# Patient Record
Sex: FEMALE | Race: WHITE
[De-identification: names, ages, dates, MRNs, and addresses within clinical notes are randomized per-mention and may not be internally consistent; named-entity substitution may affect disease eponyms.]

---

## 2017-03-10 ENCOUNTER — HOSPITAL ENCOUNTER (OUTPATIENT)
Dept: HOSPITAL 80 - FIMAGING | Age: 42
End: 2017-03-10
Attending: OBSTETRICS & GYNECOLOGY
Payer: COMMERCIAL

## 2017-03-10 DIAGNOSIS — N92.0: Primary | ICD-10-CM

## 2017-06-05 ENCOUNTER — HOSPITAL ENCOUNTER (EMERGENCY)
Dept: HOSPITAL 80 - FED | Age: 42
Discharge: HOME | End: 2017-06-05
Payer: COMMERCIAL

## 2017-06-05 VITALS
HEART RATE: 70 BPM | DIASTOLIC BLOOD PRESSURE: 76 MMHG | OXYGEN SATURATION: 98 % | TEMPERATURE: 98.1 F | SYSTOLIC BLOOD PRESSURE: 122 MMHG

## 2017-06-05 VITALS — RESPIRATION RATE: 16 BRPM

## 2017-06-05 DIAGNOSIS — I10: ICD-10-CM

## 2017-06-05 DIAGNOSIS — J01.00: ICD-10-CM

## 2017-06-05 DIAGNOSIS — G43.009: Primary | ICD-10-CM

## 2017-06-05 NOTE — EDPHY
HPI/HX/ROS/PE/MDM


Narrative: 





CHIEF COMPLAINT: Headache





HISTORY OF PRESENT ILLNESS: The patient is a 41-year-old female with history of 

migraines, who presents with headache that started around 12 p.m. The patient 

had a headache yesterday, no associated vomiting, or light sensitivity. She 

took Ibuprofen and headache improved. Around 12 p.m. today she developed 

frontal headache, that feels similar to previous migraines. She has not had a 

migraine for five years. Her headache today is severe, it is localized across 

the frontal region.   She has associated vomiting and photophobia. The patient 

recently had UTI and was on antibiotics. She denies numbness, tingling, or 

weakness in her extremities. She reports no recent head trauma. The patient is 

noncompliant with her hypertension medication, she states she does not take it 

because of the side effects. She had a cold last week. No persistent symptoms. 





REVIEW OF SYSTEMS:


Aside from elements discussed in the HPI, a comprehensive 10-point review of 

systems was reviewed and is negative.





PAST MEDICAL HISTORY: Migraines, Hypertension.  Patient reports that she has 

not been taking her antihypertensive medications.





SOCIAL HISTORY: . Lives in [place]. 





VITAL SIGNS Reviewed by me.


GENERAL:  Very uncomfortable appearing, resting in a dark room.


HEENT:  Atraumatic.  Eyes:  PERRL, EOMI, no nystagmus.  No icterus.  No 

injection.  Mouth:  moist mucous membranes.  No erythema or lesions. Neck:  No 

meningitis.  Nontender to palpation.  No adenopathy.   Negative Kernig's.  

Negative Brudzinski's.  No meningismus.


LUNGS:  Clear to auscultation bilaterally, no wheezes, rhonchi or rales.


CARDIAC:  Regular rate and rhythm, no rubs, murmurs or gallops.


ABDOMEN:  Soft, nontender, nondistended, bowel sounds normal.


BACK:  No CVA tenderness.


EXTREMITIES:  No trauma. No edema.  Range of motion is normal throughout.


NEURO:  Alert and oriented, cranial nerves II through XII are intact.  Motor 

strength 5 over 5 in all major muscle groups.  Sensation intact to light touch.

  Normal gait.


SKIN:  Warm and dry, no rash.


PSYCHIATRIC:  Normal mentation, no agitation.





Portions of this note were transcribed by a medical scribe.  I personally 

performed a history, physical exam, medical decision making, and confirmed 

accuracy of information the transcribed note.


ED Course: 


Patient presents with migraine headache. Patient has not had migraine for over 

5 years, with no recent workups. She reports severe, stabbing frontal headache. 

Associated vomiting and photophobia. IV established, patient received IV Zofran 

for nausea. Plan to treat headache with IV Decadron, Benadryl, Dilaudid, Reglan

, and fluids. I ordered a head CT for further evaluation. 





Patient's head CT demonstrates no intracranial findings but the patient does 

have bilateral maxillary sinusitis.  On re-examination she reports having a 

cold last week.  Patient's pain has been decreased from 8/10 to 4/10.  Toradol 

was administered.





Patient was improved following the Toradol.  She was comfortable being 

discharged home with hydrocodone to use as needed for any recurrent 

breakthrough pain. Patient was also instructed to continue taking her Bactrim 

for full 10 days to treat any acute maxillary sinusitis.


MDM: 





After history was obtained, and the physical exam performed, a differential for 

headache was considered including, but not limited to, subarachnoid hemorrhage, 

migraine headache, tension headache and infectious causes such as meningitis, 

sinusitis, encephalitis.





- Data Points


Imaging Results: 


 Imaging Impressions





Head CT  06/05/17 20:19


Impression:


1.  Moderate bilateral maxillary sinusitis..


2. Otherwise, normal noncontrast CT brain.


 


Findings and recommendations discussed with Emergency Department physician, Ely Das M.D., at 2130 hours, on June 5, 2017.


 


Final report concurs with initial preliminary interpretation.











Imaging: Discussed imaging studies w/ On call Radiologist


Medications Given: 


 








Discontinued Medications





Dexamethasone (Decadron Injection)  10 mg IVP EDNOW ONE


   Stop: 06/05/17 20:19


   Last Admin: 06/05/17 20:44 Dose:  10 mg


Diphenhydramine HCl (Benadryl Injection)  25 mg IVP EDNOW ONE


   Stop: 06/05/17 20:19


   Last Admin: 06/05/17 20:44 Dose:  25 mg


Hydromorphone HCl (Dilaudid)  0.5 mg IVP EDNOW ONE


   Stop: 06/05/17 20:19


   Last Admin: 06/05/17 20:45 Dose:  0.5 mg


Sodium Chloride (Ns)  1,000 mls @ 0 mls/hr IV ONCE ONE


   PRN Reason: Wide Open


   Stop: 06/05/17 19:57


   Last Admin: 06/05/17 19:57 Dose:  1,000 mls


Ketorolac Tromethamine (Toradol)  30 mg IVP EDNOW ONE


   Stop: 06/05/17 21:39


   Last Admin: 06/05/17 21:45 Dose:  30 mg


Metoclopramide HCl (Reglan Injection)  10 mg IVP EDNOW ONE


   Stop: 06/05/17 20:19


   Last Admin: 06/05/17 20:45 Dose:  10 mg


Ondansetron HCl (Zofran)  4 mg IVP EDNOW ONE


   Stop: 06/05/17 19:55


   Last Admin: 06/05/17 19:56 Dose:  4 mg








General


Time Seen by Provider: 06/05/17 19:55


Initial Vital Signs: 


 Initial Vital Signs











Temperature (C)  36.8 C   06/05/17 18:52


 


Heart Rate  72   06/05/17 18:52


 


Respiratory Rate  16   06/05/17 18:52


 


Blood Pressure  167/95 H  06/05/17 18:52


 


O2 Sat (%)  99   06/05/17 18:52








 











O2 Delivery Mode               Room Air














Allergies/Adverse Reactions: 


 





No Known Allergies Allergy (Unverified 03/07/16 20:28)


 








Home Medications: 














 Medication  Instructions  Recorded


 


Lisinopril  03/07/16


 


Bactrim DS  06/05/17


 


Hydrocodone/APAP 5/325 [Norco 1 tab PO Q6H PRN #8 tab 06/05/17





5/325 (RX)]  














Departure





- Departure


Disposition: Home, Routine, Self-Care


Clinical Impression: 


Migraine headache


Qualifiers:


 Migraine type: without aura Status migrainosus presence: without status 

migrainosus Intractability: not intractable Qualified Code(s): G43.009 - 

Migraine without aura, not intractable, without status migrainosus





Sinusitis


Qualifiers:


 Sinusitis location: maxillary Chronicity: acute Recurrence: not specified as 

recurrent Qualified Code(s): J01.00 - Acute maxillary sinusitis, unspecified





Condition: Good


Instructions:  Migraine Headache (ED)


Additional Instructions: 


Followup with your primary care physician as needed. 





Return to the Emergency Department if symptoms return. 





Okay to take hydrocodone if needed for ongoing pain.  Be sure you get a good 

night sleep.





To treat your sinusitis, I recommend over-the-counter Flonase as well as Afrin 

nasal spray for decongestant.  Sinusitis typically will not clear well without 

a decongestant.  Please call your physician to have your Bactrim extended for 

full 10 days.


Referrals: 


Jose Hernandez MD [Primary Care Provider] - As per Instructions


Prescriptions: 


Hydrocodone/APAP 5/325 [Norco 5/325 (RX)] 1 tab PO Q6H PRN #8 tab


 PRN Reason: Pain


Report Scribed for: Ely Das


Report Scribed by: Alexandra Gundersen


Date of Report: 06/05/17


Time of Report: 20:20

## 2017-09-05 ENCOUNTER — HOSPITAL ENCOUNTER (EMERGENCY)
Dept: HOSPITAL 80 - FED | Age: 42
Discharge: HOME | End: 2017-09-05
Payer: COMMERCIAL

## 2017-09-05 VITALS — RESPIRATION RATE: 18 BRPM | OXYGEN SATURATION: 99 % | TEMPERATURE: 98.2 F

## 2017-09-05 VITALS — SYSTOLIC BLOOD PRESSURE: 125 MMHG | HEART RATE: 67 BPM | DIASTOLIC BLOOD PRESSURE: 85 MMHG

## 2017-09-05 DIAGNOSIS — R06.02: Primary | ICD-10-CM

## 2017-09-05 DIAGNOSIS — I10: ICD-10-CM

## 2017-09-05 DIAGNOSIS — E86.9: ICD-10-CM

## 2017-09-05 LAB
% IMMATURE GRANULYOCYTES: 0.3 % (ref 0–1.1)
ABSOLUTE IMMATURE GRANULOCYTES: 0.02 10^3/UL (ref 0–0.1)
ABSOLUTE NRBC COUNT: 0 10^3/UL (ref 0–0.01)
ADD DIFF?: NO
ADD MORPH?: NO
ADD SCAN?: NO
ANION GAP SERPL CALC-SCNC: 11 MEQ/L (ref 8–16)
ATYPICAL LYMPHOCYTE FLAG: 0 (ref 0–99)
CALCIUM SERPL-MCNC: 10.4 MG/DL (ref 8.5–10.4)
CHLORIDE SERPL-SCNC: 102 MEQ/L (ref 97–110)
CO2 SERPL-SCNC: 26 MEQ/L (ref 22–31)
CREAT SERPL-MCNC: 1 MG/DL (ref 0.6–1)
ERYTHROCYTE [DISTWIDTH] IN BLOOD BY AUTOMATED COUNT: 12.6 % (ref 11.5–15.2)
FRAGMENT RBC FLAG: 0 (ref 0–99)
GFR SERPL CREATININE-BSD FRML MDRD: > 60 ML/MIN/{1.73_M2}
GLUCOSE SERPL-MCNC: 88 MG/DL (ref 70–100)
HCT VFR BLD CALC: 44.7 % (ref 38–47)
HGB BLD-MCNC: 15.2 G/DL (ref 12.6–16.3)
LEFT SHIFT FLG: 0 (ref 0–99)
LIPEMIA HEMOLYSIS FLAG: 90 (ref 0–99)
MAGNESIUM SERPL-MCNC: 2 MG/DL (ref 1.6–2.3)
MCH RBC BLDCO QN: 30.2 PG (ref 27.9–34.1)
MCHC RBC AUTO-ENTMCNC: 34 G/DL (ref 32.4–36.7)
MCV RBC AUTO: 88.9 FL (ref 81.5–99.8)
NRBC-AUTO%: 0 % (ref 0–0.2)
PLATELET # BLD: 228 10^3/UL (ref 150–400)
PLATELET CLUMPS FLAG: 0 (ref 0–99)
PMV BLD AUTO: 11.3 FL (ref 8.7–11.7)
POTASSIUM SERPL-SCNC: 3.8 MEQ/L (ref 3.5–5.2)
RBC # BLD AUTO: 5.03 10^6/UL (ref 4.18–5.33)
SODIUM SERPL-SCNC: 139 MEQ/L (ref 134–144)
TROPONIN I SERPL-MCNC: < 0.012 NG/ML (ref 0–0.03)

## 2017-09-05 NOTE — EDPHY
H & P


Stated Complaint: sob chest tightness/pressure since wednesday


Time Seen by Provider: 09/05/17 12:41





- Personal History


LMP (Females 10-55): 1-7 Days Ago


Current Tetanus/Diphtheria Vaccine: Yes


Tetanus Vaccine Date: WITHIN 10 YRS





- Medical/Surgical History


Hx Asthma: No


Hx Chronic Respiratory Disease: No


Hx Diabetes: No


Hx Cardiac Disease: No


Hx Renal Disease: No


Hx Cirrhosis: No


Hx Alcoholism: No


Hx HIV/AIDS: No


Hx Splenectomy or Spleen Trauma: No


Other PMH: KIDNEY STONE WITH LITHOTRIPSY, HTN,





- Social History


Smoking Status: Never smoked


Constitutional: 


 Initial Vital Signs











Temperature (C)  36.8 C   09/05/17 11:25


 


Heart Rate  75   09/05/17 11:25


 


Respiratory Rate  18   09/05/17 11:25


 


Blood Pressure  160/110 H  09/05/17 11:25


 


O2 Sat (%)  99   09/05/17 11:25








 











O2 Delivery Mode               Room Air














Allergies/Adverse Reactions: 


 





No Known Allergies Allergy (Verified 09/05/17 11:24)


 








Home Medications: 














 Medication  Instructions  Recorded


 


Hydrochlorothiazide  09/05/17














Medical Decision Making





- Diagnostics


Imaging Results: 


 Imaging Impressions





Chest X-Ray  09/05/17 12:48


Impression: Normal chest.











ED Course/Re-evaluation: 





CHIEF COMPLAINT:  Intermittent chest heaviness





HISTORY OF PRESENT ILLNESS:  Healthy 42-year-old female who over the last 

several days is notice some intermittent chest heaviness on the left upper 

chest area when she takes a deep breath.  She denies any true shortness of 

breath.  She denies any difficulty with her exercise over the last couple 

weeks.  She denies any cardiovascular history.  She denies any prior 

cardiovascular workup.  She denies any recent travel or prolonged sitting.  I 

cannot elicit any DVT risk factors.  Patient denies radiation of the pain.  

Patient denies any significant family history of coronary artery disease or 

blood clots.





REVIEW OF SYSTEMS:  





A 10 point review of systems was performed and is negative with the exception 

of the elements mentioned in the history of present illness.





PHYSICAL EXAM:  





HR, BP, O2 Sat, RR.  Temp noted


General Appearance:  Alert, well hydrated, appropriate, and non-toxic appearing.


Head:  Atraumatic without scalp tenderness or obvious injury


Eyes:  Pupils equal, round, reactive to light and accommodation, EOMI, no trauma

, no injection.


Ears:  Clear bilaterally, no perforation, normal landmarks


Nose:  Atraumatic, no rhinorrhea, clear.


Throat:  There is no erythema or exudates, no lesions, normal tonsils, mucus 

membranes moist.


Neck:  Supple, 2+ carotid upstroke, nontender, no lymphadenopathy.


Respiratory:  No retractions, no distress, no wheezes, and no accessory muscle 

use.  Lungs are clear to auscultation bilaterally.


Cardiovascular:  Regular rate and rhythm, no murmurs, rubs, or gallops. 

Bilateral carotid, radial, dorsalis pedis, and posterior tibial pulses intact. 

Good capillary refill all extremities.


Gastrointestinal:  Abdomen is soft, nontender, non-distended, no masses, no 

rebound, no guarding, no peritoneal signs.


Musculoskeletal:  Normal active ROM of all extremities, atraumatic.


Neurological:  Alert, appropriate, and interactive.  The patient has normal 

DTRs and non-focal cranial nerves, motor, sensory, and cerebellar exam.


Skin:  No rashes, good turgor, no nodules on palpation.





Past medical history:  None


Past surgical history:  None


Family history:  Noncontributory specifically no early cardiovascular disease 

and no early thromboembolic disease


Social history:  Single, employed, does not abuse tobacco drugs or alcohol





DIAGNOSTICS/PROCEDURES/CRITICAL CARE TIME:  


Study:  PA and Lateral Chest X-ray





Indication:  chest pressure


Results:  After viewing the images myself on the PACS system.  My 

interpretation of the images is:  no acute process.  The radiologist 

interpretation is pending at the time of this dictation. 





DIFFERENTIAL DIAGNOSIS:   The differential diagnosis for the patient's chest 

pressure included but was not limited to myocardial ischemia, pulmonary embolus

, chest wall pain, pleural inflammation, and pulmonary infectious causes.





MEDICAL DECISION MAKING:  This patient has no distress and no findings on 

physical exam.  EKG is unremarkable and is sinus mechanism with no evidence of 

ischemia or right heart strain.  She has normal intervals.  Chest x-ray is 

unremarkable.  Laboratory studies are pending.  Patient has no risk factors for 

cardiovascular disease or thromboembolic disease historically. 





- Data Points


Laboratory Results: 


 Laboratory Results





 09/05/17 12:35 





 09/05/17 12:35 





 











  09/05/17 09/05/17 09/05/17





  12:35 12:35 12:35


 


WBC      6.56 10^3/uL 10^3/uL





     (3.80-9.50) 


 


RBC      5.03 10^6/uL 10^6/uL





     (4.18-5.33) 


 


Hgb      15.2 g/dL g/dL





     (12.6-16.3) 


 


Hct      44.7 % %





     (38.0-47.0) 


 


MCV      88.9 fL fL





     (81.5-99.8) 


 


MCH      30.2 pg pg





     (27.9-34.1) 


 


MCHC      34.0 g/dL g/dL





     (32.4-36.7) 


 


RDW      12.6 % %





     (11.5-15.2) 


 


Plt Count      228 10^3/uL 10^3/uL





     (150-400) 


 


MPV      11.3 fL fL





     (8.7-11.7) 


 


Neut % (Auto)      58.5 % %





     (39.3-74.2) 


 


Lymph % (Auto)      33.7 % %





     (15.0-45.0) 


 


Mono % (Auto)      6.7 % %





     (4.5-13.0) 


 


Eos % (Auto)      0.3 % L %





     (0.6-7.6) 


 


Baso % (Auto)      0.5 % %





     (0.3-1.7) 


 


Nucleat RBC Rel Count      0.0 % %





     (0.0-0.2) 


 


Absolute Neuts (auto)      3.84 10^3/uL 10^3/uL





     (1.70-6.50) 


 


Absolute Lymphs (auto)      2.21 10^3/uL 10^3/uL





     (1.00-3.00) 


 


Absolute Monos (auto)      0.44 10^3/uL 10^3/uL





     (0.30-0.80) 


 


Absolute Eos (auto)      0.02 10^3/uL L 10^3/uL





     (0.03-0.40) 


 


Absolute Basos (auto)      0.03 10^3/uL 10^3/uL





     (0.02-0.10) 


 


Absolute Nucleated RBC      0.00 10^3/uL 10^3/uL





     (0-0.01) 


 


Immature Gran %      0.3 % %





     (0.0-1.1) 


 


Immature Gran #      0.02 10^3/uL 10^3/uL





     (0.00-0.10) 


 


D-Dimer    0.28 ug/mLFEU ug/mLFEU  





    (0.00-0.50)  


 


Sodium  139 mEq/L mEq/L    





   (134-144)   


 


Potassium  3.8 mEq/L mEq/L    





   (3.5-5.2)   


 


Chloride  102 mEq/L mEq/L    





   ()   


 


Carbon Dioxide  26 mEq/l mEq/l    





   (22-31)   


 


Anion Gap  11 mEq/L mEq/L    





   (8-16)   


 


BUN  22 mg/dL mg/dL    





   (7-23)   


 


Creatinine  1.0 mg/dL mg/dL    





   (0.6-1.0)   


 


Estimated GFR  > 60     





    


 


Glucose  88 mg/dL mg/dL    





   ()   


 


Calcium  10.4 mg/dL mg/dL    





   (8.5-10.4)   


 


Magnesium  2.0 mg/dL mg/dL    





   (1.6-2.3)   


 


Troponin I  < 0.012 ng/mL ng/mL    





   (0.000-0.034)   


 


NT-Pro-B Natriuret Pep  356 pg/mL H pg/mL    





   (0-125)   











Medications Given: 


 








Discontinued Medications





Aspirin (Aspirin)  324 mg PO EDNOW ONE


   Stop: 09/05/17 12:49


   Last Admin: 09/05/17 12:57 Dose:  324 mg


Sodium Chloride (Ns)  500 mls @ 1,000 mls/hr IV EDNOW ONE


   PRN Reason: Protocol


   Stop: 09/05/17 13:17


   Last Admin: 09/05/17 12:58 Dose:  500 mls








Departure





- Departure


Disposition: Home, Routine, Self-Care


Clinical Impression: 


 Shortness of breath





Condition: Good


Instructions:  Dyspnea (ED)


Additional Instructions: 


1. Follow up with your primary care provider for continued evaluation of 

symptoms. 





2. Return to the emergency department for worsening shortness of breath, chest 

pain, fever, or other worsening of condition. 


Referrals: 


Jose Hernandez MD [Primary Care Provider] - As per Instructions

## 2017-09-05 NOTE — CPEKG
Heart Rate: 66

RR Interval: 909

P-R Interval: 132

QRSD Interval: 106

QT Interval: 444

QTC Interval: 466

P Axis: 87

QRS Axis: 95

T Wave Axis: 52

EKG Severity - NORMAL ECG -

EKG Impression: SINUS RHYTHM

Electronically Signed By: Ernst Mccray 05-Sep-2017 14:47:44

## 2018-01-26 ENCOUNTER — HOSPITAL ENCOUNTER (OUTPATIENT)
Dept: HOSPITAL 80 - BMCIMAGING | Age: 43
End: 2018-01-26
Attending: FAMILY MEDICINE
Payer: COMMERCIAL

## 2018-01-26 DIAGNOSIS — S93.491A: Primary | ICD-10-CM
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
## 2018-04-23 ENCOUNTER — HOSPITAL ENCOUNTER (OUTPATIENT)
Dept: HOSPITAL 80 - BMCIMAGING | Age: 43
End: 2018-04-23
Attending: OBSTETRICS & GYNECOLOGY
Payer: COMMERCIAL

## 2018-04-23 DIAGNOSIS — Z12.31: Primary | ICD-10-CM

## 2018-07-30 ENCOUNTER — HOSPITAL ENCOUNTER (OUTPATIENT)
Dept: HOSPITAL 80 - BMCIMAGING | Age: 43
End: 2018-07-30
Attending: INTERNAL MEDICINE
Payer: COMMERCIAL

## 2018-07-30 DIAGNOSIS — K87: ICD-10-CM

## 2018-07-30 DIAGNOSIS — N20.0: ICD-10-CM

## 2018-07-30 DIAGNOSIS — D30.01: ICD-10-CM

## 2018-07-30 DIAGNOSIS — R93.5: Primary | ICD-10-CM

## 2018-08-29 ENCOUNTER — HOSPITAL ENCOUNTER (OUTPATIENT)
Dept: HOSPITAL 80 - FIMAGING | Age: 43
End: 2018-08-29
Attending: PHYSICIAN ASSISTANT
Payer: COMMERCIAL

## 2018-08-29 DIAGNOSIS — R93.3: Primary | ICD-10-CM

## 2018-09-10 ENCOUNTER — HOSPITAL ENCOUNTER (OUTPATIENT)
Dept: HOSPITAL 80 - FIMAGING | Age: 43
End: 2018-09-10
Attending: PHYSICIAN ASSISTANT
Payer: COMMERCIAL

## 2018-09-10 DIAGNOSIS — R11.0: ICD-10-CM

## 2018-09-10 DIAGNOSIS — R10.9: Primary | ICD-10-CM

## 2018-09-10 PROCEDURE — 78227 HEPATOBIL SYST IMAGE W/DRUG: CPT

## 2018-09-10 PROCEDURE — CF1CYZZ PLANAR NUCLEAR MEDICINE IMAGING OF HEPATOBILIARY SYSTEM, ALL USING OTHER RADIONUCLIDE: ICD-10-PCS | Performed by: RADIOLOGY

## 2018-09-10 PROCEDURE — A9537 TC99M MEBROFENIN: HCPCS

## 2019-02-21 ENCOUNTER — HOSPITAL ENCOUNTER (OUTPATIENT)
Dept: HOSPITAL 80 - BMCIMAGING | Age: 44
End: 2019-02-21
Payer: COMMERCIAL

## 2019-03-12 ENCOUNTER — HOSPITAL ENCOUNTER (OUTPATIENT)
Dept: HOSPITAL 80 - FIMAGING | Age: 44
End: 2019-03-12
Attending: SURGERY
Payer: COMMERCIAL

## 2019-03-12 DIAGNOSIS — K82.8: Primary | ICD-10-CM

## 2019-03-12 DIAGNOSIS — N20.0: ICD-10-CM

## 2019-03-14 ENCOUNTER — HOSPITAL ENCOUNTER (OUTPATIENT)
Dept: HOSPITAL 80 - FIMAGING | Age: 44
End: 2019-03-14
Attending: PHYSICIAN ASSISTANT
Payer: COMMERCIAL

## 2019-03-14 DIAGNOSIS — R10.31: Primary | ICD-10-CM
